# Patient Record
Sex: FEMALE | Race: WHITE | NOT HISPANIC OR LATINO | Employment: UNEMPLOYED | URBAN - METROPOLITAN AREA
[De-identification: names, ages, dates, MRNs, and addresses within clinical notes are randomized per-mention and may not be internally consistent; named-entity substitution may affect disease eponyms.]

---

## 2019-08-27 ENCOUNTER — TELEPHONE (OUTPATIENT)
Dept: TRANSPLANT | Facility: CLINIC | Age: 29
End: 2019-08-27

## 2020-02-14 ENCOUNTER — DOCUMENTATION ONLY (OUTPATIENT)
Dept: TRANSPLANT | Facility: CLINIC | Age: 30
End: 2020-02-14

## 2022-03-08 ENCOUNTER — HISTORICAL (OUTPATIENT)
Dept: ADMINISTRATIVE | Facility: HOSPITAL | Age: 32
End: 2022-03-08

## 2022-03-08 LAB
HUMAN PAPILLOMAVIRUS (HPV): NORMAL
PAP RECOMMENDATION EXT: NORMAL
PAP SMEAR: NORMAL

## 2022-04-15 ENCOUNTER — HISTORICAL (OUTPATIENT)
Dept: ADMINISTRATIVE | Facility: HOSPITAL | Age: 32
End: 2022-04-15

## 2022-05-20 PROBLEM — F32.A DEPRESSION: Status: ACTIVE | Noted: 2022-05-20

## 2022-05-20 PROBLEM — F43.10 POSTTRAUMATIC STRESS DISORDER: Status: ACTIVE | Noted: 2022-05-20

## 2022-06-02 ENCOUNTER — OFFICE VISIT (OUTPATIENT)
Dept: FAMILY MEDICINE | Facility: CLINIC | Age: 32
End: 2022-06-02
Payer: MEDICAID

## 2022-06-02 VITALS
SYSTOLIC BLOOD PRESSURE: 117 MMHG | TEMPERATURE: 98 F | HEART RATE: 80 BPM | RESPIRATION RATE: 18 BRPM | DIASTOLIC BLOOD PRESSURE: 78 MMHG | OXYGEN SATURATION: 96 % | WEIGHT: 208 LBS

## 2022-06-02 DIAGNOSIS — Z30.9 ENCOUNTER FOR CONTRACEPTIVE MANAGEMENT, UNSPECIFIED TYPE: ICD-10-CM

## 2022-06-02 PROCEDURE — 99213 OFFICE O/P EST LOW 20 MIN: CPT | Mod: PBBFAC

## 2022-06-02 RX ORDER — PROPRANOLOL HYDROCHLORIDE 20 MG/1
20 TABLET ORAL 2 TIMES DAILY
COMMUNITY

## 2022-06-02 RX ORDER — LEVETIRACETAM 500 MG/1
500 TABLET, EXTENDED RELEASE ORAL 2 TIMES DAILY
COMMUNITY

## 2022-06-02 NOTE — PROGRESS NOTES
Subjective:       Patient ID: Jaqueline Huddleston is a 32 y.o. female.    Chief Complaint: Postpartum exam    HPI   31 yo F  s/p repeat LTCS on 22 with PMH of Seizure disorder, Chronic Hypertension presented to clinic for postpartum exam,     Lochia-none  Depression- denies sadness, guilt, SI or HI  Intimate Partner Violence: none  Contraception: Patient desires IUD (mirena). Efficacy, risks, benefits discussed with the patient. Because patient needs prior authorization, will schedule another visit for IUD.   Of note, patient is Breastfeeding. Her LMP was on May 30th and lasted for 2 days, minimum bleeding.   Incision site is good. Denies drainage, fevers, or chills.    Review of Systems    See above  Objective:       Vitals:    22 1437   BP: 117/78   Pulse: 80   Resp: 18   Temp: 98 °F (36.7 °C)     Physical Exam    Gen appearance NAD  HEENT EOMI  CV S1/S2, no murmurs, rubs or gallops  Resp Clear breath sounds bilaterally  Abdomen Soft, non-tender. Incision site looks clean, dry, and intact.   Ext no edema    Assessment and Plan:       1. Postpartum care and examination  -Stable  - No acute complaints    2. Contraception  - Patient desires IUD placement (Mirena). Will schedule RTC in 2 weeks.      RTC 2 weeks for IUD placement

## 2022-06-17 ENCOUNTER — OFFICE VISIT (OUTPATIENT)
Dept: FAMILY MEDICINE | Facility: CLINIC | Age: 32
End: 2022-06-17
Payer: MEDICAID

## 2022-06-17 VITALS
OXYGEN SATURATION: 97 % | WEIGHT: 207.44 LBS | HEART RATE: 91 BPM | SYSTOLIC BLOOD PRESSURE: 127 MMHG | DIASTOLIC BLOOD PRESSURE: 77 MMHG | RESPIRATION RATE: 18 BRPM

## 2022-06-17 DIAGNOSIS — Z30.9 ENCOUNTER FOR CONTRACEPTIVE MANAGEMENT, UNSPECIFIED TYPE: Primary | ICD-10-CM

## 2022-06-17 DIAGNOSIS — Z30.430 ENCOUNTER FOR IUD INSERTION: ICD-10-CM

## 2022-06-17 LAB
B-HCG UR QL: NEGATIVE
CTP QC/QA: YES

## 2022-06-17 PROCEDURE — 99213 OFFICE O/P EST LOW 20 MIN: CPT | Mod: PBBFAC,25

## 2022-06-17 PROCEDURE — 81025 URINE PREGNANCY TEST: CPT | Mod: PBBFAC

## 2022-06-17 PROCEDURE — 96372 THER/PROPH/DIAG INJ SC/IM: CPT | Mod: PBBFAC

## 2022-06-17 RX ORDER — MEDROXYPROGESTERONE ACETATE 150 MG/ML
150 INJECTION, SUSPENSION INTRAMUSCULAR
Qty: 1 ML | Refills: 0 | Status: SHIPPED | OUTPATIENT
Start: 2022-06-17 | End: 2022-06-17 | Stop reason: SDUPTHER

## 2022-06-17 RX ORDER — MEDROXYPROGESTERONE ACETATE 150 MG/ML
150 INJECTION, SUSPENSION INTRAMUSCULAR
Status: DISCONTINUED | OUTPATIENT
Start: 2022-06-17 | End: 2022-06-17

## 2022-06-17 RX ORDER — MEDROXYPROGESTERONE ACETATE 150 MG/ML
150 INJECTION, SUSPENSION INTRAMUSCULAR
Status: SHIPPED | OUTPATIENT
Start: 2022-06-17

## 2022-06-17 RX ADMIN — MEDROXYPROGESTERONE ACETATE 150 MG: 150 INJECTION, SUSPENSION INTRAMUSCULAR at 02:06

## 2022-06-17 NOTE — PROGRESS NOTES
I have seen the patient, reviewed the resident's history and physical, assessment, plan, and progress note. I have personally interviewed and examined the patient at bedside and: agree with the findings.     Unable to properly sound uterus.      Edis Walter MD  [facility]

## 2022-06-17 NOTE — PROGRESS NOTES
Subjective:       Patient ID: Jaqueline Huddleston is a 32 y.o. female.    Chief Complaint: Contraception    HPI     31 yo F  s/p repeat LTCS on 22 with PMH of Seizure disorder, Chronic Hypertension presented to clinic for IUD insertion,    Interval history: no significant changes. Here for a Mirena IUD insertion.     Lochia-none  Depression- denies sadness, guilt, SI or HI  Intimate Partner Violence: none  Contraception: Patient desires IUD (mirena). Efficacy, risks, benefits discussed with the patient. Of note, patient is Breastfeeding. Her LMP was on May 30th and lasted for 2 days, minimum bleeding.   Incision site is good. Denies drainage, fevers, or chills.    Review of Systems    See above  Objective:       Vitals:    22 1336   BP: 127/77   Pulse: 91   Resp: 18       Physical Exam    Physical Exam    Gen appearance NAD  HEENT EOMI  CV S1/S2, no murmurs, rubs or gallops  Resp Clear breath sounds bilaterally  Abdomen Soft, non-tender. Incision site looks clean, dry, and intact.   Ext no edema     Labs:   Urine pregnancy test negative  Procedure Details   Pt placed in dorsal lithotomy position, sterile speculum inserted. Cervix cleansed with betadine. Uterus sounded to 4 cm with difficulty proceeding with sound. IUD was unable to be inserted. All instruments were removed from the vagina. Patient tolerated the procedure well. Minimal EBL noted.           Assessment:       1. Encounter for contraceptive management, unspecified type    2. Encounter for IUD insertion        Plan:        - UPT negative    - Unable to complete Mirena IUD insertion. Will refer to OB/GYN clinic.   - Will prescribe Depo-Provera q 3 months (starting 22)

## 2023-05-02 ENCOUNTER — DOCUMENTATION ONLY (OUTPATIENT)
Dept: ADMINISTRATIVE | Facility: HOSPITAL | Age: 33
End: 2023-05-02
Payer: MEDICAID